# Patient Record
Sex: MALE | Race: WHITE | NOT HISPANIC OR LATINO
[De-identification: names, ages, dates, MRNs, and addresses within clinical notes are randomized per-mention and may not be internally consistent; named-entity substitution may affect disease eponyms.]

---

## 2020-08-14 PROBLEM — Z00.00 ENCOUNTER FOR PREVENTIVE HEALTH EXAMINATION: Status: ACTIVE | Noted: 2020-08-14

## 2020-08-24 ENCOUNTER — TRANSCRIPTION ENCOUNTER (OUTPATIENT)
Age: 35
End: 2020-08-24

## 2020-08-24 ENCOUNTER — APPOINTMENT (OUTPATIENT)
Dept: PLASTIC SURGERY | Facility: CLINIC | Age: 35
End: 2020-08-24
Payer: COMMERCIAL

## 2020-08-24 VITALS — HEIGHT: 70 IN | BODY MASS INDEX: 22.19 KG/M2 | WEIGHT: 155 LBS

## 2020-08-24 DIAGNOSIS — F64.9 GENDER IDENTITY DISORDER, UNSPECIFIED: ICD-10-CM

## 2020-08-24 PROCEDURE — 99203 OFFICE O/P NEW LOW 30 MIN: CPT | Mod: 95

## 2020-08-26 PROBLEM — F64.9 GENDER DYSPHORIA: Status: ACTIVE | Noted: 2020-08-25

## 2020-08-26 NOTE — REASON FOR VISIT
[Consultation] : a consultation visit [FreeTextEntry1] : Pt is looking to discuss facial feminization of the brows, nose and chin. Pt reports having gender augmentation and breast augmentation May 2017. Pt denies having any other treatments.

## 2020-09-16 ENCOUNTER — OUTPATIENT (OUTPATIENT)
Dept: OUTPATIENT SERVICES | Facility: HOSPITAL | Age: 35
LOS: 1 days | End: 2020-09-16

## 2020-09-16 ENCOUNTER — RESULT REVIEW (OUTPATIENT)
Age: 35
End: 2020-09-16

## 2020-09-16 ENCOUNTER — APPOINTMENT (OUTPATIENT)
Dept: CT IMAGING | Facility: CLINIC | Age: 35
End: 2020-09-16
Payer: COMMERCIAL

## 2020-09-16 PROCEDURE — 70486 CT MAXILLOFACIAL W/O DYE: CPT | Mod: 26

## 2021-06-18 ENCOUNTER — APPOINTMENT (OUTPATIENT)
Dept: PLASTIC SURGERY | Facility: HOSPITAL | Age: 36
End: 2021-06-18